# Patient Record
(demographics unavailable — no encounter records)

---

## 2025-01-09 NOTE — REASON FOR VISIT
[FreeTextEntry1] : Chief complaint: Hypercholesterolemia, family history of premature coronary artery disease

## 2025-01-09 NOTE — HISTORY OF PRESENT ILLNESS
[FreeTextEntry1] : The patient is a 60-year-old white female with a past medical history remarkable for hypercholesterolemia, a family history of premature coronary artery disease, and obesity who presents for evaluation. The patient reports that she stopped smoking approximately 2 years ago.  She initiated an exercise program last week.  Laboratory results were requested from her primary care physician.  Past medical history: Hypercholesterolemia, obesity Past surgical history: Total knee replacement surgery Family history: Father MI 50s Social history: Stopped smoking 2 years ago

## 2025-01-09 NOTE — DISCUSSION/SUMMARY
[EKG obtained to assist in diagnosis and management of assessed problem(s)] : EKG obtained to assist in diagnosis and management of assessed problem(s) [FreeTextEntry1] : Abnormal ECG/family history of premature CAD An echocardiogram was ordered to rule out a cardiomyopathy or valvular abnormality as the etiology of her abnormal ECG. Coronary CTA ordered to rule out obstructive coronary artery disease in view of the patient's abnormal ECG, hypercholesterolemia, obesity, and family history of premature coronary artery disease. If her ischemic evaluation is unremarkable, she will be instructed to initiate an aerobic exercise program.  Hypercholesterolemia Laboratory results requested  Obesity BMI 34 We discussed diet and weight loss.  Exercise will be rediscussed after the patient's cardiac evaluation has been completed  RTO after testing

## 2025-01-09 NOTE — CARDIOLOGY SUMMARY
[de-identified] : Normal sinus rhythm, increased RS ratio V2 [de-identified] : -Abnormal ECG -Hypercholesterolemia -Family history of premature CAD: Father MI 50s -Obesity: BMI 34 -Abnormal ECG

## 2025-03-06 NOTE — HISTORY OF PRESENT ILLNESS
[FreeTextEntry1] : The patient is a 60-year-old white female with a past medical history remarkable for hypercholesterolemia, a family history of premature coronary artery disease, and obesity who presented for evaluation. Echocardiography demonstrated a normal ejection fraction and the absence of valve disease.  A coronary CTA demonstrated an elevated calcium score of 104 and nonobstructive disease consisting of a proximal LAD 30 to 40% obstruction, first diagonal 10%, second diagonal 20%, and distal RCA 30%. Laboratories from 2/25/2025 demonstrated hypercholesterolemia.  Total cholesterol 224, HDL 59, , triglycerides 185. The patient reports that she stopped smoking approximately 2 years ago.  She recently initiated an exercise program.  She has been walking without chest pain or dyspnea

## 2025-03-06 NOTE — CARDIOLOGY SUMMARY
[de-identified] : - CAD: CCTA: 2/11/2025, , pLAD 30 to 40%, D1 10%, D2 20%, dRCA 30% -Abnormal ECG -Hypercholesterolemia -Family history of premature CAD: Father MI 50s -Obesity: BMI 34 -Abnormal ECG -ECHOCARDIOGRAM: 1/28/2025, EF 65%, trace tricuspid regurgitation PA systolic 19

## 2025-03-06 NOTE — DISCUSSION/SUMMARY
[FreeTextEntry1] : Coronary artery disease Nonobstructive by coronary CTA Aspirin 81 mg daily prescribed Rosuvastatin 10 mg nightly prescribed.  Recheck fasting lipids in 2 months  Hypercholesterolemia  2/25/2025, total cholesterol 224, HDL 59, , triglycerides 185. Rosuvastatin prescribed as noted above   Obesity BMI 34 We discussed diet, exercise, and weight loss.    RTO 6 months

## 2025-07-25 NOTE — DISCUSSION/SUMMARY
[FreeTextEntry1] : Obesity BMI 34 We discussed diet, exercise, and weight loss.   Discussed for 15 minutes with this adult patient, BMI >30. I have instructed the patient to follow a 1500 calories meal plan emphasizing low saturated fat sources and protein with low amount of sodium.   Information on avoiding fast food, fried foods, food with high fat content was suggested, plant based low fat, high fiber diet.  We reviewed the efforts of weight reduction identifying 3500 calories in pound of body fat and the need to gradually achieve a long term basis for weight reduction  discussed the need to reduce calories for what her current patterns are and to hopefully increase physical activity as well. We discussed menu selection as well as food preparation techniques.  Educated patient on 150 minutes of moderate intensity exercise weekly with strength training twice weekly. Encouraged patient to monitor physical activity  Discussed the importance of a balanced, healthy diet of nourishing foods and consistent meal pattern for long-term success and health maintenance. Encouraged to increase water intake to facilitate adequate hydration and to cut out sugary drinks and alcohol.  Pt educated on eating 4-6 small meals per day, that are high in protein for hunger regulation.  Pt educated on cutting out high-sugar content foods sabotaging wt loss  Pt verbalized understanding    Plan:  we discussed obesity implications with general health and cardiovascular process. I discussed in detail issues that can potentially arise with the cardiovascular and general health system. The patient is actively in the gym trying to lose weight. Does not want bariatric surgery nor medications for obesity at this time. offered nutritional counseling referral and was deferred at this time.     Discussed medication options:  Patient counseled on diet and exercise 150mins of moderate intensity exercise/weekly  Discussed conservative management for weight loss Discussed nutrition and dietician to help with improvement of diet. Counseled on AE of medications Discussed oral medications vs injectable medications discussed long term AE of weight loss meds and long term effects of obesity. Pt declined any hx of MEN syndrome or thyroid medullary carcinoma or pancreatitis, eye concerns. will need yearly eye exam if on GLP-1.  Understands to go to the gym and exercise with light weights, increase protein intake to prevent skeletal muscle decline. Discussed metformin off label use for weight loss was also discusse  GLP-1 Weight Management Counseling Patient counseled regarding initiation of GLP-1 receptor agonist therapy (e.g., semaglutide, liraglutide) for management of obesity and weight reduction. Rationale: GLP-1 agonists are FDA-approved for chronic weight management in adults with BMI 30 or 27 with weight-related comorbidities such as Obstructive sleep apnea. Chosen due to inadequate weight loss with lifestyle modification alone.  Benefits: Promotes significant weight loss when paired with diet and exercise. May reduce risk of obesity-related conditions (e.g., diabetes, hypertension, cardiovascular disease). Low risk for hypoglycemia. Risks and Common Side Effects:  Gastrointestinal: Nausea, vomiting, diarrhea, constipation, decreased appetite (most common; often transient and dose-dependent). Less common: Dyspepsia, abdominal discomfort, headache, fatigue. Rare but serious: Pancreatitis, gallbladder disease, medullary thyroid carcinoma (reported in animal studies), hypoglycemia (mainly if used with certain diabetes medications). Potential worsening of diabetic retinopathy (rare, mainly in patients with pre-existing retinopathy and rapid glycemic improvement). Contraindications/Precautions Discussed:  Personal or family history of medullary thyroid carcinoma or Multiple Endocrine Neoplasia syndrome type 2. History of pancreatitis.  Counseling Points: Medication should be used along with sustained lifestyle changes (healthy diet and increased physical activity). Instructed to start at a low dose and titrate gradually to minimize GI side effects. Advise to seek medical attention for severe abdominal pain, persistent vomiting, or symptoms suggestive of pancreatitis. Advised of the importance of regular follow-up for monitoring weight, efficacy, tolerability, and any side effects. Patient Understanding: Patient verbalized understanding of the medication's purpose, benefits, risks, potential side effects, and the ongoing lifestyle changes required. Questions addressed to satisfaction.  Shared decision making performed; patient elected to proceed with therapy. Monitoring and follow-up plan established.   Discussed to strongly pursue preventative cardiology, lifestyle modifications which are necessary for long-term cardiovascular health.  Provided structured, face-to-face preventive counseling focused on Advised a plant based, limited salt, low fat, minimal dairy diet, stress reduction/psychosomatic management, sleep hygiene/TREY testing and healthy weight loss, annual influenza vaccine, medication compliance, high risk behavior mitigation (I.e. tobacco abstinence, alcohol abstinence, recreational/illicit drug use abstinence), increased exercise activity as per AHA/ACC standard for long term cardiovascular risk reduction. lasting approximately 15 minutes. Discussed health risks, provided evidence-based guidance, and engaged the patient in shared decision-making to promote behavior change. Patient was receptive and actively participated in the discussion.  Follow up: with me in 3-6 months

## 2025-07-25 NOTE — CARDIOLOGY SUMMARY
[de-identified] : - CAD: CCTA: 2/11/2025, , pLAD 30 to 40%, D1 10%, D2 20%, dRCA 30% -Abnormal ECG -Hypercholesterolemia -Family history of premature CAD: Father MI 50s -Obesity: BMI 34 -Abnormal ECG -ECHOCARDIOGRAM: 1/28/2025, EF 65%, trace tricuspid regurgitation PA systolic 19

## 2025-07-25 NOTE — PHYSICAL EXAM
[Well Developed] : well developed [Well Nourished] : well nourished [No Acute Distress] : no acute distress [Obese] : obese [Normal Conjunctiva] : normal conjunctiva [Normal Venous Pressure] : normal venous pressure [No Carotid Bruit] : no carotid bruit [Normal S1, S2] : normal S1, S2 [No Murmur] : no murmur [No Rub] : no rub [No Gallop] : no gallop [Clear Lung Fields] : clear lung fields [Good Air Entry] : good air entry [No Respiratory Distress] : no respiratory distress  [No Masses/organomegaly] : no masses/organomegaly [Normal Bowel Sounds] : normal bowel sounds [Normal Gait] : normal gait [No Edema] : no edema [No Cyanosis] : no cyanosis [No Clubbing] : no clubbing [No Varicosities] : no varicosities [Normal Radial B/L] : normal radial B/L [Normal PT B/L] : normal PT B/L [No Rash] : no rash [No Skin Lesions] : no skin lesions [Moves all extremities] : moves all extremities [No Focal Deficits] : no focal deficits [Normal Speech] : normal speech [Alert and Oriented] : alert and oriented [Normal memory] : normal memory [de-identified] : Not performed during the telephone telemedicine visit

## 2025-07-25 NOTE — HISTORY OF PRESENT ILLNESS
[FreeTextEntry1] : The patient is a 60-year-old white female with a past medical history remarkable for coronary artery disease, hypercholesterolemia, a family history of premature coronary artery disease, and obesity. Echocardiography demonstrated a normal ejection fraction and the absence of valve disease.  A coronary CTA demonstrated an elevated calcium score of 104 and nonobstructive disease consisting of a proximal LAD 30 to 40% obstruction, first diagonal 10%, second diagonal 20%, and distal RCA 30%. Laboratories from 2/25/2025 demonstrated hypercholesterolemia.  Total cholesterol 224, HDL 59, , triglycerides 185.  Statin therapy was initiated.  The patient's cholesterol was well-controlled on 6/11/2025, total cholesterol 142, HDL 59, LDL 62, and triglycerides 125. The patient reports that she stopped smoking approximately 2 years ago.    obesity patient exercising actively in gym however finding difficult to lose weight has changed diet, very minimal cards, has increased protein and fluid intake, green leafy vegetables still struggling with weight loss. no hx of men syndrome, thyroid cancer in family or self, pancreatic issues in family or self. active with light weights and understands to get yearly eye examination. wants to try weight loss medications and not bariatric surgery given difficulty with weight loss.